# Patient Record
Sex: FEMALE | Employment: UNEMPLOYED | URBAN - METROPOLITAN AREA
[De-identification: names, ages, dates, MRNs, and addresses within clinical notes are randomized per-mention and may not be internally consistent; named-entity substitution may affect disease eponyms.]

---

## 2024-10-24 ENCOUNTER — OFFICE VISIT (OUTPATIENT)
Dept: PRIMARY CARE CLINIC | Age: 49
End: 2024-10-24
Payer: COMMERCIAL

## 2024-10-24 VITALS
HEIGHT: 61 IN | WEIGHT: 172.01 LBS | OXYGEN SATURATION: 97 % | SYSTOLIC BLOOD PRESSURE: 116 MMHG | HEART RATE: 77 BPM | BODY MASS INDEX: 32.47 KG/M2 | DIASTOLIC BLOOD PRESSURE: 84 MMHG | TEMPERATURE: 97.1 F

## 2024-10-24 DIAGNOSIS — D50.9 IRON DEFICIENCY ANEMIA, UNSPECIFIED IRON DEFICIENCY ANEMIA TYPE: ICD-10-CM

## 2024-10-24 DIAGNOSIS — R73.9 HYPERGLYCEMIA: ICD-10-CM

## 2024-10-24 DIAGNOSIS — E55.9 VITAMIN D DEFICIENCY: ICD-10-CM

## 2024-10-24 DIAGNOSIS — Z12.31 ENCOUNTER FOR SCREENING MAMMOGRAM FOR MALIGNANT NEOPLASM OF BREAST: ICD-10-CM

## 2024-10-24 DIAGNOSIS — Z00.00 ENCOUNTER FOR ANNUAL PHYSICAL EXAM: Primary | ICD-10-CM

## 2024-10-24 DIAGNOSIS — Z00.00 ENCOUNTER FOR ANNUAL PHYSICAL EXAM: ICD-10-CM

## 2024-10-24 DIAGNOSIS — R53.82 CHRONIC FATIGUE: ICD-10-CM

## 2024-10-24 DIAGNOSIS — Z12.11 SCREEN FOR COLON CANCER: ICD-10-CM

## 2024-10-24 DIAGNOSIS — G89.29 CHRONIC NECK PAIN: ICD-10-CM

## 2024-10-24 DIAGNOSIS — M54.2 CHRONIC NECK PAIN: ICD-10-CM

## 2024-10-24 LAB
25(OH)D3 SERPL-MCNC: 19.7 NG/ML
ALBUMIN SERPL-MCNC: 4.5 G/DL (ref 3.4–5)
ALBUMIN/GLOB SERPL: 2.1 {RATIO} (ref 1.1–2.2)
ALP SERPL-CCNC: 57 U/L (ref 40–129)
ALT SERPL-CCNC: 39 U/L (ref 10–40)
ANION GAP SERPL CALCULATED.3IONS-SCNC: 8 MMOL/L (ref 3–16)
AST SERPL-CCNC: 26 U/L (ref 15–37)
BILIRUB SERPL-MCNC: 0.5 MG/DL (ref 0–1)
BILIRUB UR QL STRIP.AUTO: NEGATIVE
BUN SERPL-MCNC: 8 MG/DL (ref 7–20)
CALCIUM SERPL-MCNC: 9.4 MG/DL (ref 8.3–10.6)
CHLORIDE SERPL-SCNC: 105 MMOL/L (ref 99–110)
CHOLEST SERPL-MCNC: 124 MG/DL (ref 0–199)
CLARITY UR: CLEAR
CO2 SERPL-SCNC: 26 MMOL/L (ref 21–32)
COLOR UR: YELLOW
CREAT SERPL-MCNC: 0.7 MG/DL (ref 0.6–1.1)
DEPRECATED RDW RBC AUTO: 13.2 % (ref 12.4–15.4)
FERRITIN SERPL IA-MCNC: 100 NG/ML (ref 15–150)
GFR SERPLBLD CREATININE-BSD FMLA CKD-EPI: >90 ML/MIN/{1.73_M2}
GLUCOSE SERPL-MCNC: 84 MG/DL (ref 70–99)
GLUCOSE UR STRIP.AUTO-MCNC: NEGATIVE MG/DL
HCT VFR BLD AUTO: 43.4 % (ref 36–48)
HDLC SERPL-MCNC: 58 MG/DL (ref 40–60)
HGB BLD-MCNC: 14.5 G/DL (ref 12–16)
HGB UR QL STRIP.AUTO: NEGATIVE
IRON SATN MFR SERPL: 42 % (ref 15–50)
IRON SERPL-MCNC: 163 UG/DL (ref 37–145)
KETONES UR STRIP.AUTO-MCNC: NEGATIVE MG/DL
LDLC SERPL CALC-MCNC: 54 MG/DL
LEUKOCYTE ESTERASE UR QL STRIP.AUTO: NEGATIVE
MCH RBC QN AUTO: 30.1 PG (ref 26–34)
MCHC RBC AUTO-ENTMCNC: 33.5 G/DL (ref 31–36)
MCV RBC AUTO: 90 FL (ref 80–100)
NITRITE UR QL STRIP.AUTO: NEGATIVE
PH UR STRIP.AUTO: 7.5 [PH] (ref 5–8)
PLATELET # BLD AUTO: 370 K/UL (ref 135–450)
PMV BLD AUTO: 7.8 FL (ref 5–10.5)
POTASSIUM SERPL-SCNC: 4.8 MMOL/L (ref 3.5–5.1)
PROT SERPL-MCNC: 6.6 G/DL (ref 6.4–8.2)
PROT UR STRIP.AUTO-MCNC: NEGATIVE MG/DL
RBC # BLD AUTO: 4.83 M/UL (ref 4–5.2)
SODIUM SERPL-SCNC: 139 MMOL/L (ref 136–145)
SP GR UR STRIP.AUTO: 1.02 (ref 1–1.03)
TIBC SERPL-MCNC: 388 UG/DL (ref 260–445)
TRIGL SERPL-MCNC: 61 MG/DL (ref 0–150)
TSH SERPL DL<=0.005 MIU/L-ACNC: 1.46 UIU/ML (ref 0.27–4.2)
UA COMPLETE W REFLEX CULTURE PNL UR: NORMAL
UA DIPSTICK W REFLEX MICRO PNL UR: NORMAL
URN SPEC COLLECT METH UR: NORMAL
UROBILINOGEN UR STRIP-ACNC: 1 E.U./DL
VLDLC SERPL CALC-MCNC: 12 MG/DL
WBC # BLD AUTO: 5.5 K/UL (ref 4–11)

## 2024-10-24 PROCEDURE — G8484 FLU IMMUNIZE NO ADMIN: HCPCS | Performed by: STUDENT IN AN ORGANIZED HEALTH CARE EDUCATION/TRAINING PROGRAM

## 2024-10-24 PROCEDURE — 99386 PREV VISIT NEW AGE 40-64: CPT | Performed by: STUDENT IN AN ORGANIZED HEALTH CARE EDUCATION/TRAINING PROGRAM

## 2024-10-24 RX ORDER — CETIRIZINE HYDROCHLORIDE 5 MG/1
5 TABLET ORAL DAILY
COMMUNITY

## 2024-10-24 RX ORDER — METHYLPREDNISOLONE 4 MG/1
TABLET ORAL
Qty: 1 KIT | Refills: 0 | Status: SHIPPED | OUTPATIENT
Start: 2024-10-24 | End: 2024-10-30

## 2024-10-24 RX ORDER — VALACYCLOVIR HYDROCHLORIDE 1 G/1
TABLET, FILM COATED ORAL
COMMUNITY

## 2024-10-24 RX ORDER — ALBUTEROL SULFATE 90 UG/1
2 AEROSOL, METERED RESPIRATORY (INHALATION) EVERY 6 HOURS PRN
Qty: 18 G | Refills: 4 | Status: SHIPPED | OUTPATIENT
Start: 2024-10-24

## 2024-10-24 RX ORDER — ALBUTEROL SULFATE 90 UG/1
AEROSOL, METERED RESPIRATORY (INHALATION)
COMMUNITY
Start: 2024-10-04 | End: 2024-10-24 | Stop reason: SDUPTHER

## 2024-10-24 SDOH — ECONOMIC STABILITY: INCOME INSECURITY: HOW HARD IS IT FOR YOU TO PAY FOR THE VERY BASICS LIKE FOOD, HOUSING, MEDICAL CARE, AND HEATING?: NOT HARD AT ALL

## 2024-10-24 SDOH — ECONOMIC STABILITY: FOOD INSECURITY: WITHIN THE PAST 12 MONTHS, YOU WORRIED THAT YOUR FOOD WOULD RUN OUT BEFORE YOU GOT MONEY TO BUY MORE.: NEVER TRUE

## 2024-10-24 SDOH — ECONOMIC STABILITY: FOOD INSECURITY: WITHIN THE PAST 12 MONTHS, THE FOOD YOU BOUGHT JUST DIDN'T LAST AND YOU DIDN'T HAVE MONEY TO GET MORE.: NEVER TRUE

## 2024-10-24 ASSESSMENT — PATIENT HEALTH QUESTIONNAIRE - PHQ9
4. FEELING TIRED OR HAVING LITTLE ENERGY: NOT AT ALL
SUM OF ALL RESPONSES TO PHQ QUESTIONS 1-9: 0
9. THOUGHTS THAT YOU WOULD BE BETTER OFF DEAD, OR OF HURTING YOURSELF: NOT AT ALL
SUM OF ALL RESPONSES TO PHQ QUESTIONS 1-9: 0
8. MOVING OR SPEAKING SO SLOWLY THAT OTHER PEOPLE COULD HAVE NOTICED. OR THE OPPOSITE, BEING SO FIGETY OR RESTLESS THAT YOU HAVE BEEN MOVING AROUND A LOT MORE THAN USUAL: NOT AT ALL
SUM OF ALL RESPONSES TO PHQ QUESTIONS 1-9: 0
SUM OF ALL RESPONSES TO PHQ9 QUESTIONS 1 & 2: 0
1. LITTLE INTEREST OR PLEASURE IN DOING THINGS: NOT AT ALL
2. FEELING DOWN, DEPRESSED OR HOPELESS: NOT AT ALL
6. FEELING BAD ABOUT YOURSELF - OR THAT YOU ARE A FAILURE OR HAVE LET YOURSELF OR YOUR FAMILY DOWN: NOT AT ALL
5. POOR APPETITE OR OVEREATING: NOT AT ALL
7. TROUBLE CONCENTRATING ON THINGS, SUCH AS READING THE NEWSPAPER OR WATCHING TELEVISION: NOT AT ALL
10. IF YOU CHECKED OFF ANY PROBLEMS, HOW DIFFICULT HAVE THESE PROBLEMS MADE IT FOR YOU TO DO YOUR WORK, TAKE CARE OF THINGS AT HOME, OR GET ALONG WITH OTHER PEOPLE: NOT DIFFICULT AT ALL
3. TROUBLE FALLING OR STAYING ASLEEP: NOT AT ALL
SUM OF ALL RESPONSES TO PHQ QUESTIONS 1-9: 0

## 2024-10-24 NOTE — PROGRESS NOTES
appearance. She is not ill-appearing.   HENT:      Head: Normocephalic and atraumatic.      Right Ear: Tympanic membrane, ear canal and external ear normal.      Left Ear: Tympanic membrane, ear canal and external ear normal.      Nose: Nose normal. No rhinorrhea.      Mouth/Throat:      Mouth: Mucous membranes are moist.      Pharynx: Oropharynx is clear. No oropharyngeal exudate.   Eyes:      General: No scleral icterus.        Right eye: No discharge.         Left eye: No discharge.      Extraocular Movements: Extraocular movements intact.      Conjunctiva/sclera: Conjunctivae normal.   Cardiovascular:      Rate and Rhythm: Normal rate and regular rhythm.      Pulses: Normal pulses.      Heart sounds: Normal heart sounds. No murmur heard.     No gallop.   Pulmonary:      Effort: Pulmonary effort is normal.      Breath sounds: Normal breath sounds. No wheezing, rhonchi or rales.   Abdominal:      General: Abdomen is flat. Bowel sounds are normal. There is no distension.      Palpations: Abdomen is soft. There is no mass.   Musculoskeletal:      Cervical back: Neck supple. No muscular tenderness.      Comments: Left hand contracted and loss of function and at baseline     Lymphadenopathy:      Cervical: No cervical adenopathy.   Skin:     General: Skin is warm.      Capillary Refill: Capillary refill takes less than 2 seconds.      Findings: No rash.   Neurological:      General: No focal deficit present.      Mental Status: She is alert.      Cranial Nerves: No cranial nerve deficit.   Psychiatric:         Mood and Affect: Mood normal.         Behavior: Behavior normal.         ASSESSMENT/PLAN:  1. Encounter for annual physical exam  General wellness exam. Reviewed chart for past hx and updated today. Counseled on age appropriate health guidance and discussed screening recommendations. Vaccinations reviewed and discussed. All questions answered    - Comprehensive Metabolic Panel; Future  - Lipid Panel; Future  -

## 2024-10-25 DIAGNOSIS — E55.9 VITAMIN D DEFICIENCY: Primary | ICD-10-CM

## 2024-10-25 LAB
EST. AVERAGE GLUCOSE BLD GHB EST-MCNC: 105.4 MG/DL
HBA1C MFR BLD: 5.3 %

## 2024-10-25 RX ORDER — ERGOCALCIFEROL 1.25 MG/1
50000 CAPSULE, LIQUID FILLED ORAL WEEKLY
Qty: 12 CAPSULE | Refills: 1 | Status: SHIPPED | OUTPATIENT
Start: 2024-10-25

## 2024-12-28 ENCOUNTER — HOSPITAL ENCOUNTER (EMERGENCY)
Age: 49
Discharge: HOME | End: 2024-12-28
Attending: STUDENT IN AN ORGANIZED HEALTH CARE EDUCATION/TRAINING PROGRAM
Payer: COMMERCIAL

## 2024-12-28 ENCOUNTER — APPOINTMENT (OUTPATIENT)
Age: 49
End: 2024-12-28
Payer: COMMERCIAL

## 2024-12-28 VITALS
SYSTOLIC BLOOD PRESSURE: 98 MMHG | WEIGHT: 176.9 LBS | OXYGEN SATURATION: 97 % | BODY MASS INDEX: 33.4 KG/M2 | HEIGHT: 61 IN | RESPIRATION RATE: 18 BRPM | DIASTOLIC BLOOD PRESSURE: 53 MMHG | TEMPERATURE: 100.2 F | HEART RATE: 90 BPM

## 2024-12-28 DIAGNOSIS — J06.9 VIRAL URI WITH COUGH: Primary | ICD-10-CM

## 2024-12-28 LAB
BILIRUB UR QL STRIP: NEGATIVE
CLARITY UR: CLEAR
COLOR UR: YELLOW
COMMENT: ABNORMAL
FLUAV AG SPEC QL: NEGATIVE
FLUBV AG SPEC QL: NEGATIVE
GLUCOSE UR STRIP-MCNC: NEGATIVE MG/DL
HGB UR QL STRIP.AUTO: NEGATIVE
KETONES UR STRIP-MCNC: 15 MG/DL
LEUKOCYTE ESTERASE UR QL STRIP: NEGATIVE
NITRITE UR QL STRIP: NEGATIVE
PH UR STRIP: >9 [PH] (ref 5–8)
PROT UR STRIP-MCNC: NEGATIVE MG/DL
SP GR UR STRIP: 1.01 (ref 1–1.03)
UROBILINOGEN UR STRIP-ACNC: 0.2 EU/DL (ref 0–1)

## 2024-12-28 PROCEDURE — 87502 INFLUENZA DNA AMP PROBE: CPT

## 2024-12-28 PROCEDURE — 99284 EMERGENCY DEPT VISIT MOD MDM: CPT

## 2024-12-28 PROCEDURE — 71046 X-RAY EXAM CHEST 2 VIEWS: CPT

## 2024-12-28 PROCEDURE — 81003 URINALYSIS AUTO W/O SCOPE: CPT

## 2024-12-28 PROCEDURE — 6370000000 HC RX 637 (ALT 250 FOR IP): Performed by: STUDENT IN AN ORGANIZED HEALTH CARE EDUCATION/TRAINING PROGRAM

## 2024-12-28 RX ORDER — MONTELUKAST SODIUM 10 MG/1
10 TABLET ORAL NIGHTLY
COMMUNITY

## 2024-12-28 RX ORDER — IBUPROFEN 800 MG/1
800 TABLET, FILM COATED ORAL ONCE
Status: COMPLETED | OUTPATIENT
Start: 2024-12-28 | End: 2024-12-28

## 2024-12-28 RX ORDER — ONDANSETRON 4 MG/1
4 TABLET, ORALLY DISINTEGRATING ORAL 3 TIMES DAILY PRN
Qty: 21 TABLET | Refills: 0 | Status: SHIPPED | OUTPATIENT
Start: 2024-12-28

## 2024-12-28 RX ORDER — ONDANSETRON 4 MG/1
4 TABLET, ORALLY DISINTEGRATING ORAL ONCE
Status: COMPLETED | OUTPATIENT
Start: 2024-12-28 | End: 2024-12-28

## 2024-12-28 RX ADMIN — IBUPROFEN 800 MG: 800 TABLET, FILM COATED ORAL at 12:56

## 2024-12-28 RX ADMIN — ONDANSETRON 4 MG: 4 TABLET, ORALLY DISINTEGRATING ORAL at 12:56

## 2024-12-28 ASSESSMENT — PAIN - FUNCTIONAL ASSESSMENT
PAIN_FUNCTIONAL_ASSESSMENT: 0-10
PAIN_FUNCTIONAL_ASSESSMENT: 0-10

## 2024-12-28 ASSESSMENT — PAIN SCALES - GENERAL
PAINLEVEL_OUTOF10: 5
PAINLEVEL_OUTOF10: 5

## 2024-12-28 ASSESSMENT — PAIN DESCRIPTION - LOCATION: LOCATION: BACK;HEAD

## 2024-12-28 NOTE — DISCHARGE INSTRUCTIONS
You were evaluated in the emergency department for symptoms suggestive of a upper respiratory infection likely a virus. Assessments and testing completed during your visit were reassuring and at this time there is no indication for further testing, treatment or admission to the hospital. Given this it is appropriate to discharge you from the emergency department. At the time of discharge we discussed the following:    Please reference the enclosed information expect your condition to improve over the next few days he may continue to use ibuprofen and Tylenol for fevers and bodyaches and you may use the antinausea medication as needed.  Please follow-up your primary doctor for further recommendations    Please note that sometimes it is difficult to diagnose a medical condition early in the disease process before the disease is fully manifest. Because of this, should you develop any new or worsening symptoms, you may return at any time to the emergency department for another evaluation. If available you are also recommended to review this visit with your primary care physician or other medical provider in the next 7 days. Thank you for allowing us to care for you today.

## 2024-12-28 NOTE — ED PROVIDER NOTES
[facility] EMERGENCY DEPT     EMERGENCY DEPARTMENT ENCOUNTER         Pt Name: Astrid Renner   MRN: [id number]   Birthdate 1975   Date of evaluation: 2024   Provider: Felix Victor MD   PCP: Elvia Fair MD   Note Started: 12:56 PM EST 24       Chief Complaint     Cold Symptoms      History of Present Illness     Astrid Renner is a 49 y.o. female who presents with 1 to 2 days of congestion sore throat minimally productive cough now complicated by some nausea and an episode of vomiting which the patient attributes to her nasal secretions.  The patient has had fevers.  Patient also has some lower back pain and is fearful of some blood in her urine therefore presents for evaluation.  The patient has no other major concerning past medical history has otherwise been in baseline health.      Review of Systems     Positives and pertinent negatives as per HPI.    Past Medical, Surgical, Family, and Social History     She has a past medical history of Fatty liver.  She has a past surgical history that includes sinus surgery;  section; and Brachioplasty.  Her family history is not on file.  She reports that she has never smoked. She has never used smokeless tobacco. She reports that she does not drink alcohol and does not use drugs.    SCREENINGS:          [place] Coma Scale  Eye Opening: Spontaneous  Best Verbal Response: Oriented  Best Motor Response: Obeys commands  [place] Coma Scale Score: 15                        CIWA Assessment  BP: (!) 98/53  Pulse: 90               Medications     Discharge Medication List as of 2024  1:57 PM        CONTINUE these medications which have NOT CHANGED    Details   montelukast (SINGULAIR) 10 MG tablet Take 1 tablet by mouth nightlyHistorical Med      vitamin D (ERGOCALCIFEROL) 1.25 MG (10236 UT) CAPS capsule Take 1 capsule by mouth once a week, Disp-12 capsule, R-1Normal      valACYclovir (VALTREX) 1 g tablet valacyclovir 1 gram  ibuprofen.  We discussed her course at this time I believe she is suffering a unspecified viral syndrome without high risk features that may be with a degree of dehydration I have encouraged her to maintain hydration this is likely reflected in her ketonuria.  She is reassured there is no evidence of bleeding in her urine.  Given this course I find no indication for further testing or treatment in the emergency department and the patient is discharged in stable condition  [NG]      ED Course User Index  [NG] Felix Victor MD       Additional Reassessment    Is this patient to be included in the SEP-1 core measure? No Exclusion criteria - the patient is NOT to be included for SEP-1 Core Measure due to: Viral etiology found or highly suspected (including COVID-19) without concomitant bacterial infection    Medical Decision Making  Presentation for likely viral syndrome reassuring course amenable to routine outpatient care discharged in stable condition peer    Problems Addressed:  Viral URI with cough: acute illness or injury    Amount and/or Complexity of Data Reviewed  Labs: ordered. Decision-making details documented in ED Course.  Radiology: ordered. Decision-making details documented in ED Course.    Risk  Prescription drug management.  Decision regarding hospitalization.          The patient was given the followingmedications:  Orders Placed This Encounter   Medications    ibuprofen (ADVIL;MOTRIN) tablet 800 mg    ondansetron (ZOFRAN-ODT) disintegrating tablet 4 mg    ondansetron (ZOFRAN-ODT) 4 MG disintegrating tablet     Sig: Take 1 tablet by mouth 3 times daily as needed for Nausea or Vomiting     Dispense:  21 tablet     Refill:  0       CONSULTS:  None      CRITICAL CARE TIME   Total Critical Care time was 0 minutes, excluding separately reportable procedures in the context of the following condition na.  There was a high probability of clinically significant/life threatening deterioration in the

## 2024-12-28 NOTE — ED TRIAGE NOTES
Pt c/o flu like symptoms x 2 days. Nasal congestion made her nauseous and last vomited at 0530 this morning. Also c/o low back pain and a headache.

## 2025-05-09 ENCOUNTER — TELEPHONE (OUTPATIENT)
Dept: PRIMARY CARE CLINIC | Age: 50
End: 2025-05-09

## 2025-05-09 NOTE — TELEPHONE ENCOUNTER
Pt called in stated Dr Jasso tested her B12 and it came back low.  She was told to let Dr Fair know to see what she wants to do.

## 2025-05-12 NOTE — TELEPHONE ENCOUNTER
Spoke with pt and she wants to do the injections for b12. Scheduled pt for 5/14/25 if you will sign the pended order.

## 2025-05-14 DIAGNOSIS — E53.8 B12 DEFICIENCY: Primary | ICD-10-CM

## 2025-05-14 RX ORDER — CYANOCOBALAMIN 1000 UG/ML
1000 INJECTION, SOLUTION INTRAMUSCULAR; SUBCUTANEOUS ONCE
Status: COMPLETED | OUTPATIENT
Start: 2025-05-14 | End: 2025-05-14

## 2025-05-29 ENCOUNTER — CLINICAL SUPPORT (OUTPATIENT)
Dept: PRIMARY CARE CLINIC | Age: 50
End: 2025-05-29
Payer: COMMERCIAL

## 2025-05-29 DIAGNOSIS — E53.8 B12 DEFICIENCY: Primary | ICD-10-CM

## 2025-05-29 PROCEDURE — 96372 THER/PROPH/DIAG INJ SC/IM: CPT | Performed by: STUDENT IN AN ORGANIZED HEALTH CARE EDUCATION/TRAINING PROGRAM

## 2025-05-29 RX ORDER — CYANOCOBALAMIN 1000 UG/ML
1000 INJECTION, SOLUTION INTRAMUSCULAR; SUBCUTANEOUS ONCE
Status: COMPLETED | OUTPATIENT
Start: 2025-05-29 | End: 2025-05-29

## 2025-05-29 RX ORDER — MULTIVITAMIN WITH IRON
500 TABLET ORAL DAILY
Qty: 30 TABLET | Refills: 3 | Status: CANCELLED | OUTPATIENT
Start: 2025-05-29 | End: 2026-05-29

## 2025-05-29 RX ADMIN — CYANOCOBALAMIN 1000 MCG: 1000 INJECTION, SOLUTION INTRAMUSCULAR; SUBCUTANEOUS at 10:31

## 2025-06-05 ENCOUNTER — CLINICAL SUPPORT (OUTPATIENT)
Dept: PRIMARY CARE CLINIC | Age: 50
End: 2025-06-05
Payer: COMMERCIAL

## 2025-06-05 DIAGNOSIS — E53.8 B12 DEFICIENCY: Primary | ICD-10-CM

## 2025-06-05 PROCEDURE — 96372 THER/PROPH/DIAG INJ SC/IM: CPT | Performed by: STUDENT IN AN ORGANIZED HEALTH CARE EDUCATION/TRAINING PROGRAM

## 2025-06-05 RX ORDER — CYANOCOBALAMIN 1000 UG/ML
1000 INJECTION, SOLUTION INTRAMUSCULAR; SUBCUTANEOUS ONCE
Status: COMPLETED | OUTPATIENT
Start: 2025-06-05 | End: 2025-06-05

## 2025-06-05 RX ADMIN — CYANOCOBALAMIN 1000 MCG: 1000 INJECTION, SOLUTION INTRAMUSCULAR; SUBCUTANEOUS at 09:17
